# Patient Record
Sex: MALE | Race: WHITE
[De-identification: names, ages, dates, MRNs, and addresses within clinical notes are randomized per-mention and may not be internally consistent; named-entity substitution may affect disease eponyms.]

---

## 2019-04-07 ENCOUNTER — HOSPITAL ENCOUNTER (EMERGENCY)
Dept: HOSPITAL 41 - JD.ED | Age: 59
LOS: 1 days | Discharge: HOME | End: 2019-04-08
Payer: COMMERCIAL

## 2019-04-07 DIAGNOSIS — S61.211A: Primary | ICD-10-CM
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
## 2019-04-07 PROCEDURE — 99282 EMERGENCY DEPT VISIT SF MDM: CPT

## 2019-04-07 PROCEDURE — 12001 RPR S/N/AX/GEN/TRNK 2.5CM/<: CPT

## 2019-04-08 NOTE — EDM.PDOC
ED HPI GENERAL MEDICAL PROBLEM





- General


Chief Complaint: Upper Extremity Injury/Pain


Stated Complaint: LEFT FINGER INJURY


Time Seen by Provider: 04/07/19 22:25


Source of Information: Reports: Patient





- History of Present Illness


INITIAL COMMENTS - FREE TEXT/NARRATIVE: 





dictated





- Related Data


 Allergies











Allergy/AdvReac Type Severity Reaction Status Date / Time


 


No Known Allergies Allergy   Verified 04/07/19 22:32











Home Meds: 


 Home Meds





. [No Known Home Meds]  04/07/19 [History]











Past Medical History





- Past Health History


Medical/Surgical History: Denies Medical/Surgical History





- Past Surgical History


Musculoskeletal Surgical History: Reports: Arthroscopic Knee, Shoulder Surgery, 

Other (See Below)


Other Musculoskeletal Surgeries/Procedures:: hip resurfacing





Social & Family History





- Tobacco Use


Smoking Status *Q: Never Smoker





- Caffeine Use


Caffeine Use: Reports: Energy Drinks





- Recreational Drug Use


Recreational Drug Use: No





Review of Systems





- Review of Systems


Review Of Systems: ROS reveals no pertinent complaints other than HPI.





ED EXAM, GENERAL





- Physical Exam


Exam: See Below


Free Text/Narrative:: 





dictated





Course





- Vital Signs


Last Recorded V/S: 


 Last Vital Signs











Temp  36.8 C   04/07/19 22:29


 


Pulse  56 L  04/07/19 22:29


 


Resp  16   04/07/19 22:29


 


BP  147/92 H  04/07/19 22:29


 


Pulse Ox  93 L  04/07/19 22:29














- Orders/Labs/Meds


Meds: 


Medications














Discontinued Medications














Generic Name Dose Route Start Last Admin





  Trade Name Ernestine  PRN Reason Stop Dose Admin


 


Lidocaine HCl  10 ml  04/07/19 23:50  04/08/19 00:08





  Xylocaine 1%  INJECT  04/07/19 23:51  10 ml





  ONETIME ONE   Administration





     





     





     





     














Departure





- Departure


Time of Disposition: 00:28


Disposition: Home, Self-Care 01


Clinical Impression: 


Laceration of finger


Qualifiers:


 Encounter type: initial encounter Finger: index finger Damage to nail status: 

without damage Foreign body presence: without foreign body Laterality: left 

Qualified Code(s): S61.211A - Laceration without foreign body of left index 

finger without damage to nail, initial encounter








- Discharge Information


Instructions:  Laceration Care, Adult


Referrals: 


Kaushal Lo MD [Primary Care Provider] - 


Forms:  ED Department Discharge


Additional Instructions: 


Have sutures removed in 7 days. Keep wound clean and dry. Watch for any signs 

of infection. Return with any concerns.

## 2019-04-08 NOTE — ER
REASON FOR EMERGENCY ROOM VISIT:

Laceration, left index finger.

 

HISTORY OF PRESENT ILLNESS:

This 58-year-old man was cutting some roast beef in a dimly lit kitchen when

knife slipped, and he sustained a very small scratch on his left thumb, but a

significant laceration on his left index finger distally.  This bled quite a

bit.  He applied pressure and wrapped a towel around it and came into the

emergency room.

 

PAST MEDICAL HISTORY:

Unremarkable.

 

CURRENT MEDICATIONS:

None.

 

ALLERGIES:

None to medications.

 

REVIEW OF SYSTEMS:

Unremarkable, reviewed.

 

PHYSICAL EXAMINATION:

GENERAL:  Reveals a pleasant 58-year-old man, in no acute distress.

EXTREMITIES:  He has a 2 cm curvilinear laceration over the lateral aspect of

distal phalanx of his left index finger.  It goes down to the subcutaneous

tissue.  It is oozing.  The curvilinear laceration is such that he actually has

a flap of distal tissue, but it all looks perfectly viable.  This measures

approximately 2 cm in total.  Distally, his sensation is intact.  It does not

appear to go deeper than the subcutaneous tissue.  There is no involvement of

the joint or tendons.

 

COURSE IN THE EMERGENCY ROOM:

It should be noted that the patient feels that he had his last tetanus booster

approximately 7 years ago.  He is going to check on this and get back to us if

it was longer than 10 years.  The wound was cleaned with bactericidal soap and

soaked.  Local anesthesia was achieved with approximately 4 mL of 1% Xylocaine

without epinephrine.  Using aseptic technique, the skin edges were approximated

with 3 interrupted 3-0 monofilament nylon sutures and the approximation was

excellent.  The skin appeared to be completely viable.  Antibiotic ointment was

applied over this, and occlusive gauze dressing was applied over this.  He was

instructed regarding wound care symptoms and signs of infection and to have his

sutures removed in approximately 8 days' time.  All questions were answered.  He

will be checking on the exact date of his last tetanus toxoid, and he will get

back to us if it was more than 10 years.

 

DD:  04/08/2019 00:26:34

DT:  04/08/2019 03:22:43  JASON

Job #:  [id number]

## 2019-04-09 NOTE — ER
ADDENDUM:

 

 

IMPRESSION:

Laceration, left index finger.

 

DD:  04/09/2019 01:27:27

DT:  04/09/2019 02:09:29  MMODAL

Job #:  [id number]

## 2019-04-15 ENCOUNTER — HOSPITAL ENCOUNTER (EMERGENCY)
Dept: HOSPITAL 41 - JD.ED | Age: 59
Discharge: HOME | End: 2019-04-15
Payer: COMMERCIAL
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
## 2019-04-15 DIAGNOSIS — S61.211D: Primary | ICD-10-CM
